# Patient Record
Sex: FEMALE | Race: OTHER | HISPANIC OR LATINO | Employment: PART TIME | URBAN - METROPOLITAN AREA
[De-identification: names, ages, dates, MRNs, and addresses within clinical notes are randomized per-mention and may not be internally consistent; named-entity substitution may affect disease eponyms.]

---
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
## 2024-07-22 ENCOUNTER — HOSPITAL ENCOUNTER (EMERGENCY)
Facility: HOSPITAL | Age: 19
Discharge: HOME | End: 2024-07-22
Attending: EMERGENCY MEDICINE
Payer: COMMERCIAL

## 2024-07-22 VITALS
SYSTOLIC BLOOD PRESSURE: 128 MMHG | BODY MASS INDEX: 18.16 KG/M2 | RESPIRATION RATE: 20 BRPM | WEIGHT: 113 LBS | OXYGEN SATURATION: 100 % | HEART RATE: 80 BPM | HEIGHT: 66 IN | DIASTOLIC BLOOD PRESSURE: 90 MMHG | TEMPERATURE: 97.7 F

## 2024-07-22 DIAGNOSIS — R10.2 VAGINAL PAIN: Primary | ICD-10-CM

## 2024-07-22 LAB
EXT PREGNANCY TEST URINE: NEGATIVE
EXT. CONTROL: NORMAL

## 2024-07-22 PROCEDURE — 99283 EMERGENCY DEPT VISIT LOW MDM: CPT

## 2024-07-22 PROCEDURE — 87591 N.GONORRHOEAE DNA AMP PROB: CPT | Performed by: EMERGENCY MEDICINE

## 2024-07-22 PROCEDURE — 99284 EMERGENCY DEPT VISIT MOD MDM: CPT | Performed by: EMERGENCY MEDICINE

## 2024-07-22 PROCEDURE — 81025 URINE PREGNANCY TEST: CPT | Performed by: EMERGENCY MEDICINE

## 2024-07-22 PROCEDURE — 87491 CHLMYD TRACH DNA AMP PROBE: CPT | Performed by: EMERGENCY MEDICINE

## 2024-07-22 PROCEDURE — 87529 HSV DNA AMP PROBE: CPT | Performed by: EMERGENCY MEDICINE

## 2024-07-22 RX ORDER — ACYCLOVIR 200 MG/1
400 CAPSULE ORAL ONCE
Status: COMPLETED | OUTPATIENT
Start: 2024-07-22 | End: 2024-07-22

## 2024-07-22 RX ORDER — ACYCLOVIR 400 MG/1
400 TABLET ORAL 3 TIMES DAILY
Qty: 21 TABLET | Refills: 0 | Status: SHIPPED | OUTPATIENT
Start: 2024-07-22 | End: 2024-07-29

## 2024-07-22 RX ADMIN — ACYCLOVIR 400 MG: 200 CAPSULE ORAL at 19:58

## 2024-07-22 NOTE — ED PROVIDER NOTES
"History  Chief Complaint   Patient presents with    Vaginal Pain     Patient reports vaginal \"sensitivity\" for 2-3 days, reports \"it hurts when I touch it\" and is at other times \"just uncomfortable\". Patient denies any pain with urination, denies any discharge but reports their period just started today. Patient also reports increased pain with intercourse.      19-year-old female presents for evaluation with vaginal pain.  She reports that for the past 2 to 3 days she has been experiencing pain with intercourse, as well as pain to the touch around her clitoris.  She denies any vaginal discharge.  She states that she has not looked at the area, is unsure if there are any lesions or rashes.  She denies any fevers, chills, abdominal pain, or other concerning symptoms.  States that she has had unprotected intercourse but that she has been with the same partner for 3 years.        None       History reviewed. No pertinent past medical history.    History reviewed. No pertinent surgical history.    History reviewed. No pertinent family history.  I have reviewed and agree with the history as documented.    E-Cigarette/Vaping    E-Cigarette Use Current Some Day User      E-Cigarette/Vaping Substances    Nicotine No     THC No     CBD No     Flavoring No     Other No     Unknown No      Social History     Tobacco Use    Smoking status: Never    Smokeless tobacco: Never   Vaping Use    Vaping status: Some Days   Substance Use Topics    Alcohol use: Yes     Comment: socially    Drug use: Yes     Types: Marijuana     Comment: socailly       Review of Systems   Constitutional:  Negative for fever.   Respiratory:  Negative for shortness of breath.    Cardiovascular:  Negative for chest pain.   Gastrointestinal:  Negative for abdominal pain, nausea and vomiting.   Genitourinary:  Positive for vaginal pain. Negative for dysuria and vaginal discharge.   All other systems reviewed and are negative.      Physical Exam  Physical " Exam  Vitals and nursing note reviewed. Exam conducted with a chaperone present.   Constitutional:       General: She is awake. She is not in acute distress.     Appearance: She is not toxic-appearing.   HENT:      Head: Normocephalic and atraumatic.   Eyes:      General: Vision grossly intact. Gaze aligned appropriately.   Cardiovascular:      Rate and Rhythm: Normal rate and regular rhythm.   Pulmonary:      Effort: Pulmonary effort is normal. No respiratory distress.   Genitourinary:     Comments: 2 small vesicular-like lesions noted on the clitoral maddne which are tender to palpation. No other lesions noted. No vaginal discharge noted on external exam.   Musculoskeletal:      Cervical back: Full passive range of motion without pain and neck supple.   Skin:     General: Skin is warm and dry.   Neurological:      General: No focal deficit present.      Mental Status: She is alert and oriented to person, place, and time.         Vital Signs  ED Triage Vitals   Temperature Pulse Respirations Blood Pressure SpO2   07/22/24 1841 07/22/24 1841 07/22/24 1841 07/22/24 1841 07/22/24 1841   97.7 °F (36.5 °C) 101 18 (!) 156/101 96 %      Temp Source Heart Rate Source Patient Position - Orthostatic VS BP Location FiO2 (%)   07/22/24 1841 07/22/24 1841 07/22/24 1841 07/22/24 1841 --   Temporal Monitor Sitting Left arm       Pain Score       07/22/24 1930       2           Vitals:    07/22/24 1841 07/22/24 1930 07/22/24 2000   BP: (!) 156/101 141/98 128/90   Pulse: 101 85 80   Patient Position - Orthostatic VS: Sitting Lying Sitting         Visual Acuity      ED Medications  Medications   acyclovir (ZOVIRAX) capsule 400 mg (400 mg Oral Given 7/22/24 1958)       Diagnostic Studies  Results Reviewed       Procedure Component Value Units Date/Time    Chlamydia/GC amplified DNA by PCR [[id number]]  (Normal) Collected: 07/22/24 1924    Lab Status: Final result Specimen: Urine, Other Updated: 07/23/24 1239     N gonorrhoeae, DNA Probe  Negative     Chlamydia trachomatis, DNA Probe Negative    Narrative:      This test was performed using the FDA-approved Mohan 6800 CT/NG assay (Roche [employer]). This test uses real-time PCR to detect Chlamydia trachomatis (CT) and Neisseria gonorrhoeae (NG). This instrument and assay have been validated by the  and performing laboratory and verified by the performing laboratory.  This test is intended as an aid in the diagnosis of chlamydial and gonococcal disease. This test has not been evaluated in patients younger than 14 years of age and is not recommended for evaluation of suspected sexual abuse. This assay is not intended to replace other exams or tests for diagnosis of urogenital infection by causative factors other than Chalmydia trachomatis (CT) and Neisseria gonorrhoeae (NG). Additional testing is recommended when the results do not correlate with clinical signs and symptoms.   Procedural Limitations  This assay has only been validated for use with male and female urine, clinician-instructed self-collected vaginal swab specimens, clinician-collected vaginal swab specimens, endocervical swab specimens collected in mohan® PCR Media and cervical specimens collected in PreservCyt® Solution. Assay performance has not been validated for use with other collection media and/or specimen types.   Detection of C. trachomatis and N. gonorrhoeaea is dependent on the number of organisms present in the specimen. Detection may be affected by specimen collection methods, patient factors, stage of infection, infecting strains, and presence of polymerase/PCR inhibitors.   When CT is present at very high concentrations, the detection of NG present at concentrations near the limit of detection may be impacted.  The presence of mucus in endocervical specimens may lead to false negative results.  The presence of whole blood in urine and cervical specimens collected in PreservCyt Solution may lead to false  negative and/or invalid test results.   Urine testing is recommended to be performed on first catch urine samples. The effects of other collection variables have not been evaluated at this time. The effects of vaginal discharge, tampon use, douching, and other collection variables have not been evaluated at this time.   This assay has not been evaluated with patients currently being treated with antimicrobial agents active against CT or NG, or patients with a history of hysterectomy.     HSV TYPE 1,2 DNA PCR [facility] SWAB ONLY [[id number]]  (Normal) Collected: 07/22/24 1925    Lab Status: Final result Specimen: Swab from Vulva Updated: 07/23/24 1231     HSV 1 PCR Not Detected     HSV 2 PCR Not Detected    Narrative:      This test has been performed using RT-PCR on the Diasorin Molecular Simplexa. This test has been FDA cleared, validated by the , and verified by the performing laboratory.  The DiaSorin Molecular Simplexa HSV 1 & 2 Direct is intended for use on the Centrix instrument for the qualitative detection and differentiation of HSV-1 and HSV-2 DNA in mucocutaneous and cutaneous lesion swabs and cerebrospinal fluid (CSF) of patients with suspected infection. Conserved regions of HSV-1 and HSV-2 DNA polymerase genes are targeted to identify HSV-1 and HSV-2 DNA, respectively. Results are for the presumptive identification of HSV-1 and/or HSV-2. This test is intended for use in conjunction with clinical presentation and other laboratory markers for the clinical management of HSV-1/HSV-2 infected patients.     Positive results are indicative of infection, but do not rule out bacterial infection or co-infection with other viruses.     Negative results do not preclude viral infection and should not be used as the sole basis for treatment or other patient management decisions. Negative results must be combined with clinical observations, patient history, and epidemiological information.   Invalid or  "inconclusive results do not preclude infection. Recollection recommended.    Procedural Limitations:  Viral nucleic acid detection is dependent on sample collection, transport, handling, storage, and preparation.  Detection of target analytes(s) does not imply that the corresponding viruses are infectious or are the causative agent for clinical symptoms.     False-negative results may occur if the viruses are present at a level below the analytical sensitivity of the assay, if the virus has genomic mutations, or if the test is performed very early in the course of illness.  When very high levels of HSV-1 are present with very low levels of HSV-2, the signal from the HSV-2 reaction may not be adequate to be detected.  This test has not been established for screening of blood or blood products for the presence of HSV.      POCT pregnancy, urine [[id number]]  (Normal) Resulted: 07/22/24 1925    Lab Status: Final result Updated: 07/22/24 1925     EXT Preg Test, Ur Negative     Control Valid                   No orders to display              Procedures  Procedures         ED Course  ED Course as of 07/24/24 1631   Mon Jul 22, 2024 1928 PREGNANCY TEST URINE: Negative         CRAFFT      Flowsheet Row Most Recent Value   CRAFFT Initial Screen: During the past 12 months, did you:    1. Drink any alcohol (more than a few sips)?  No Filed at: 07/22/2024 1926   2. Smoke any marijuana or hashish No Filed at: 07/22/2024 1926   3. Use anything else to get high? (\"anything else\" includes illegal drugs, over the counter and prescription drugs, and things that you sniff or 'kwong')? No Filed at: 07/22/2024 1926                                              Medical Decision Making  19 year old female presents for evaluation with vaginal pain. On external exam, patient noted to have 2 small vesicular-like lesions on the clitoral madden which were tender to the touch. No other lesions, rashes, or discharge noted. Area was swabbed for " HSV. Patient also agreeable to GC/chlamydia testing which was done through the urine. Patient given empiric treatment for possible HSV infection with oral acyclovir. Discharged home in stable condition with symptomatic care instructions, Gyn follow up, and strict ED return precautions.     Amount and/or Complexity of Data Reviewed  Labs: ordered. Decision-making details documented in ED Course.    Risk  Prescription drug management.                 Disposition  Final diagnoses:   Vaginal pain     Time reflects when diagnosis was documented in both MDM as applicable and the Disposition within this note       Time User Action Codes Description Comment    7/22/2024  8:01 PM Agustina Flowers Add [R10.2] Vaginal pain           ED Disposition       ED Disposition   Discharge    Condition   Stable    Date/Time   Mon Jul 22, 2024 2001    Comment   Faith Tilley discharge to home/self care.                   Follow-up Information       Follow up With Specialties Details Why Contact Info Additional Information    [facility] Obstetrics & Gynecology [facility] Ob[facility]etrics and Gynecology Schedule an appointment as soon as possible for a visit in 1 week  [address] [address]  [phone] [facility] Obstetrics & Gynecology [facility], [address] [address]   [phone]    [facility] Emergency Department Emergency Medicine Go to  If symptoms worsen [address] [facility] [id number] [phone] [facility], [address] [place], [place], [address]            Discharge Medication List as of 7/22/2024  8:04 PM        START taking these medications    Details   acyclovir (ZOVIRAX) 400 MG tablet Take 1 tablet (400 mg total) by mouth 3 (three) times a day for 7 days, Starting Mon 7/22/2024, Until Mon 7/29/2024, Normal             No discharge procedures on file.    PDMP Review       None             ED Provider  Electronically Signed by             Agustina Flowers,   07/24/24 8852

## 2024-07-22 NOTE — ED NOTES
Assigned ER Provider and RN x1 in with pt. Vaginal exam in progress, pt tolerated well.     Rafael Grant RN  07/22/24 0831

## 2024-07-23 LAB
C TRACH DNA SPEC QL NAA+PROBE: NEGATIVE
HSV1 DNA SPEC QL NAA+PROBE: NOT DETECTED
HSV1 DNA SPEC QL NAA+PROBE: NOT DETECTED
N GONORRHOEA DNA SPEC QL NAA+PROBE: NEGATIVE

## 2024-07-23 NOTE — DISCHARGE INSTRUCTIONS
You will be called with the results of your testing.  You may take the prescribed antiviral medication until you receive these results.  You should either abstain from sexual intercourse or use barrier protection until directed otherwise.

## 2024-09-22 ENCOUNTER — HOSPITAL ENCOUNTER (EMERGENCY)
Facility: HOSPITAL | Age: 19
Discharge: HOME | End: 2024-09-22
Attending: EMERGENCY MEDICINE
Payer: COMMERCIAL

## 2024-09-22 VITALS
DIASTOLIC BLOOD PRESSURE: 78 MMHG | SYSTOLIC BLOOD PRESSURE: 116 MMHG | OXYGEN SATURATION: 100 % | TEMPERATURE: 98.8 F | HEART RATE: 100 BPM | HEIGHT: 66 IN | RESPIRATION RATE: 20 BRPM | WEIGHT: 113.98 LBS | BODY MASS INDEX: 18.32 KG/M2

## 2024-09-22 DIAGNOSIS — L02.91 ABSCESS: Primary | ICD-10-CM

## 2024-09-22 PROCEDURE — 99282 EMERGENCY DEPT VISIT SF MDM: CPT

## 2024-09-22 PROCEDURE — 99284 EMERGENCY DEPT VISIT MOD MDM: CPT | Performed by: EMERGENCY MEDICINE

## 2024-09-22 PROCEDURE — 10061 I&D ABSCESS COMP/MULTIPLE: CPT | Performed by: EMERGENCY MEDICINE

## 2024-09-22 RX ORDER — SULFAMETHOXAZOLE/TRIMETHOPRIM 800-160 MG
1 TABLET ORAL 2 TIMES DAILY
Qty: 14 TABLET | Refills: 0 | Status: SHIPPED | OUTPATIENT
Start: 2024-09-22 | End: 2024-09-29

## 2024-09-22 RX ORDER — IBUPROFEN 600 MG/1
600 TABLET, FILM COATED ORAL EVERY 6 HOURS PRN
Qty: 30 TABLET | Refills: 0 | Status: SHIPPED | OUTPATIENT
Start: 2024-09-22

## 2024-09-22 RX ORDER — LIDOCAINE HYDROCHLORIDE AND EPINEPHRINE 10; 10 MG/ML; UG/ML
1 INJECTION, SOLUTION INFILTRATION; PERINEURAL ONCE
Status: DISCONTINUED | OUTPATIENT
Start: 2024-09-22 | End: 2024-09-22 | Stop reason: HOSPADM

## 2024-09-22 NOTE — DISCHARGE INSTRUCTIONS
A  personal message from Dr. Dakotah Rowe,  Thank you so much for allowing me to care for you today.    I pride myself in the care and attention I give all my patients.  I hope you were a witness to this tonight.   If for any reason your condition does not improve or worsens, or you have a question that was not answered during your visit you can feel free to text me on my personal phone #  # [phone].   I will answer to your message and continue your care past your emergency room visit.     Please understand that although you are being discharged because your condition has been deemed stable and able to be managed on an outpatient setting. However your condition may worsen as part of the natural progression of the illness/condition, if this occurs please come back to the emergency department for a repeat evaluation.

## 2024-09-22 NOTE — ED PROVIDER NOTES
1. Abscess      ED Disposition       ED Disposition   Discharge    Condition   Stable    Date/Time   Sun Sep 22, 2024 12:41 PM    Comment   Faith Tilley discharge to home/self care.                   Assessment & Plan       Medical Decision Making  With L axillary soft tissue mass    Procedure note: Incision and drainage, performed by me  Area was cleansed with alcohol swab.  Then numbed with lido with epi by local infiltration approximately 2 to 3 cc.  After which #11 blade was used to dissect into the wound and positive small to months of purulent material was obtained.  The area was explored for loculations.  And left open to heal.    Patient did get sweaty and near syncopal during the episode so she was laid down to rest for a little while to recover.    Problems Addressed:  Abscess: acute illness or injury    Risk  Prescription drug management.                     Medications - No data to display    History of Present Illness       Faith Tilley is a 19 y.o.  year old female  History reviewed. No pertinent past medical history.  Social History    Tobacco Use      Smoking status: Never      Smokeless tobacco: Never    Vaping Use      Vaping status: Some Days    Alcohol use: Yes      Comment: socially    Drug use: Yes      Types: Marijuana      Comment: socailly    Patient presents with:  Mass: Patient arrived to ER c/o lump that was noticed a year ago, worsening 2 weeks ago. Patient states its increased in size, located underneath lt arm pit. 8/10 pain. Patient states she squeezed it and puss came out on wednesday, now it is hard to touch and painful.       History obtained directly from the PATIENT              History provided by:  Patient   used: No        Review of Systems   Constitutional:  Negative for chills and fever.   HENT:  Negative for ear pain and sore throat.    Eyes:  Negative for pain and visual disturbance.   Respiratory:  Negative for cough and shortness of breath.     Cardiovascular:  Negative for chest pain and palpitations.   Gastrointestinal:  Negative for abdominal pain and vomiting.   Genitourinary:  Negative for dysuria and hematuria.   Musculoskeletal:  Negative for arthralgias and back pain.   Skin:  Positive for wound. Negative for color change and rash.   Neurological:  Negative for seizures and syncope.   All other systems reviewed and are negative.          Objective     ED Triage Vitals [09/22/24 1227]   Temperature Pulse Blood Pressure Respirations SpO2 Patient Position - Orthostatic VS   98.8 °F (37.1 °C) 100 116/78 20 100 % Sitting      Temp Source Heart Rate Source BP Location FiO2 (%) Pain Score    Tympanic Monitor Left arm -- --        Physical Exam  Vitals and nursing note reviewed.   Constitutional:       General: She is not in acute distress.     Appearance: Normal appearance. She is well-developed and normal weight.   HENT:      Head: Normocephalic and atraumatic.   Eyes:      Conjunctiva/sclera: Conjunctivae normal.   Cardiovascular:      Rate and Rhythm: Normal rate and regular rhythm.      Heart sounds: No murmur heard.  Pulmonary:      Effort: Pulmonary effort is normal. No respiratory distress.      Breath sounds: Normal breath sounds.   Abdominal:      Palpations: Abdomen is soft.      Tenderness: There is no abdominal tenderness.   Musculoskeletal:         General: No swelling.      Cervical back: Neck supple.   Skin:     General: Skin is warm and dry.      Capillary Refill: Capillary refill takes less than 2 seconds.      Comments: Patient with soft mass in the L axillae  No active drainage  NOT hot to touch  + tender   Neurological:      General: No focal deficit present.      Mental Status: She is alert and oriented to person, place, and time.   Psychiatric:         Mood and Affect: Mood normal.         Labs Reviewed - No data to display  No orders to display       Procedures    ED Medication and Procedure Management   Prior to Admission  Medications   Prescriptions Last Dose Informant Patient Reported? Taking?   acyclovir (ZOVIRAX) 400 MG tablet   No No   Sig: Take 1 tablet (400 mg total) by mouth 3 (three) times a day for 7 days      Facility-Administered Medications: None     Discharge Medication List as of 9/22/2024 12:43 PM        START taking these medications    Details   ibuprofen (MOTRIN) 600 mg tablet Take 1 tablet (600 mg total) by mouth every 6 (six) hours as needed for moderate pain, Starting Sun 9/22/2024, Normal      sulfamethoxazole-trimethoprim (BACTRIM DS) 800-160 mg per tablet Take 1 tablet by mouth 2 (two) times a day for 7 days smx-tmp DS (BACTRIM) 800-160 mg tabs (1tab q12 D10), Starting Sun 9/22/2024, Until Sun 9/29/2024, Normal           CONTINUE these medications which have NOT CHANGED    Details   acyclovir (ZOVIRAX) 400 MG tablet Take 1 tablet (400 mg total) by mouth 3 (three) times a day for 7 days, Starting Mon 7/22/2024, Until Mon 7/29/2024, Normal           No discharge procedures on file.     Dakotah Rowe MD  09/24/24 1188

## 2025-04-30 ENCOUNTER — HOSPITAL ENCOUNTER (EMERGENCY)
Facility: HOSPITAL | Age: 20
Discharge: HOME | End: 2025-04-30
Payer: COMMERCIAL

## 2025-04-30 VITALS
SYSTOLIC BLOOD PRESSURE: 147 MMHG | RESPIRATION RATE: 20 BRPM | DIASTOLIC BLOOD PRESSURE: 89 MMHG | HEART RATE: 102 BPM | OXYGEN SATURATION: 100 % | WEIGHT: 116.4 LBS | BODY MASS INDEX: 18.79 KG/M2 | TEMPERATURE: 98.9 F

## 2025-04-30 DIAGNOSIS — K13.0: Primary | ICD-10-CM

## 2025-04-30 PROCEDURE — 99284 EMERGENCY DEPT VISIT MOD MDM: CPT

## 2025-04-30 PROCEDURE — 96372 THER/PROPH/DIAG INJ SC/IM: CPT

## 2025-04-30 PROCEDURE — 99282 EMERGENCY DEPT VISIT SF MDM: CPT

## 2025-04-30 RX ORDER — PREDNISONE 20 MG/1
20 TABLET ORAL DAILY
Qty: 5 TABLET | Refills: 0 | Status: SHIPPED | OUTPATIENT
Start: 2025-04-30

## 2025-04-30 RX ORDER — NAPROXEN 500 MG/1
500 TABLET ORAL 2 TIMES DAILY WITH MEALS
Qty: 30 TABLET | Refills: 0 | Status: SHIPPED | OUTPATIENT
Start: 2025-04-30

## 2025-04-30 RX ORDER — KETOROLAC TROMETHAMINE 30 MG/ML
15 INJECTION, SOLUTION INTRAMUSCULAR; INTRAVENOUS ONCE
Status: COMPLETED | OUTPATIENT
Start: 2025-04-30 | End: 2025-04-30

## 2025-04-30 RX ORDER — DEXAMETHASONE 4 MG/1
10 TABLET ORAL ONCE
Status: COMPLETED | OUTPATIENT
Start: 2025-04-30 | End: 2025-04-30

## 2025-04-30 RX ORDER — DIPHENHYDRAMINE HCL 25 MG
25 TABLET ORAL ONCE
Status: COMPLETED | OUTPATIENT
Start: 2025-04-30 | End: 2025-04-30

## 2025-04-30 RX ADMIN — DEXAMETHASONE 10 MG: 4 TABLET ORAL at 22:13

## 2025-04-30 RX ADMIN — KETOROLAC TROMETHAMINE 15 MG: 30 INJECTION, SOLUTION INTRAMUSCULAR at 22:13

## 2025-04-30 RX ADMIN — DIPHENHYDRAMINE HYDROCHLORIDE 25 MG: 25 TABLET ORAL at 22:19

## 2025-05-01 NOTE — ED PROVIDER NOTES
Time reflects when diagnosis was documented in both MDM as applicable and the Disposition within this note       Time User Action Codes Description Comment    4/30/2025 10:11 PM Pepe Champagneel Add [K13.0] Hypertrophic oral labial frenulum           ED Disposition       ED Disposition   Discharge    Condition   Stable    Date/Time   Wed Apr 30, 2025 10:12 PM    Comment   Faith Tilley discharge to home/self care.                   Assessment & Plan       Medical Decision Making  Patient is a 20 year old female with no significant PMHx, presenting to the ED for evaluation of upper gum/lip swelling noticed yesterday. Patient states that she noticed that the frenulum of her upper lip was irritated yesterday, and she felt it was larger than normal. Today notes associated upper lip swelling in the area. Denies any tongue swelling, airway edema, difficulty swallowing, mouth itching, dental pain, difficulty talking. Patient states that she does not floss, but does brush her teeth regularly. Denies fevers, chills, rash.    Ddx: frenulum irritation/hypertrophy. Doubt cellulitis, angioedema, acute allergic reaction, dentalgia, dental infection, or abscess.    Patient is afebrile, overall well appearing. No evidence of airway compromise. Will treat with antiinflammatories and steroids. Patient states that she is going to the [facility] tomorrow, where she has a dentist that she can see. No indication for antibiotic therapy at this time. Will Rx Naprosyn and Prednisone. Patient also provided with Benadryl in the ED for discomfort, though doubt this is from acute allergic reaction.     Patient observed in the ED for improvement and she does note some improvement in swelling with medications. Stable for discharge home and was given strict return precautions.    I gave oral return precautions for what to return for in addition to the written return precautions.   The patient verbalized understanding of the discharge  instructions and warnings that would necessitate return to the Emergency Department.  I specifically highlighted areas of special concern regarding the written and verbal discharge instructions and return precautions.    All questions were answered prior to discharge.      Risk  OTC drugs.  Prescription drug management.             Medications   dexamethasone (DECADRON) tablet 10 mg (10 mg Oral Given 4/30/25 2213)   ketorolac (TORADOL) injection 15 mg (15 mg Intramuscular Given 4/30/25 2213)   diphenhydrAMINE (BENADRYL) tablet 25 mg (25 mg Oral Given 4/30/25 2219)       ED Risk Strat Scores                    No data recorded        SBIRT 20yo+      Flowsheet Row Most Recent Value   Initial Alcohol Screen: US AUDIT-C     1. How often do you have a drink containing alcohol? 0 Filed at: 04/30/2025 2206   2. How many drinks containing alcohol do you have on a typical day you are drinking?  0 Filed at: 04/30/2025 2206   3b. FEMALE Any Age, or MALE 65+: How often do you have 4 or more drinks on one occassion? 0 Filed at: 04/30/2025 2206   Audit-C Score 0 Filed at: 04/30/2025 2206   KRYSTEN: How many times in the past year have you...    Used an illegal drug or used a prescription medication for non-medical reasons? Never Filed at: 04/30/2025 2206                            History of Present Illness       Chief Complaint   Patient presents with    Lip Swelling     Pt to ER from home for upper gum / lip swelling since yesterday. Upper gum soft tissue coming through gap in upper teeth now as well.       Past Medical History:   Diagnosis Date    Breast mass, left 2018    Scoliosis       History reviewed. No pertinent surgical history.   Family History   Problem Relation Age of Onset    Alopecia Mother     Arthritis Mother     Hypertension Mother     Heart disease Brother     Liver cancer Maternal Grandfather       Social History     Tobacco Use    Smoking status: Never    Smokeless tobacco: Never   Vaping Use    Vaping status:  Some Days   Substance Use Topics    Alcohol use: Yes     Comment: socially    Drug use: Yes     Types: Marijuana     Comment: socailly      E-Cigarette/Vaping    E-Cigarette Use Current Some Day User       E-Cigarette/Vaping Substances    Nicotine No     THC No     CBD No     Flavoring No     Other No     Unknown No       I have reviewed and agree with the history as documented.     HPI    Patient is a 20 year old female with no significant PMHx, presenting to the ED for evaluation of upper gum/lip swelling noticed yesterday. Patient states that she noticed that the frenulum of her upper lip was irritated yesterday, and she felt it was larger than normal. Today notes associated upper lip swelling in the area. Denies any tongue swelling, airway edema, difficulty swallowing, mouth itching, dental pain, difficulty talking. Patient states that she does not floss, but does brush her teeth regularly. Denies fevers, chills, rash.    Review of Systems   All other systems reviewed and are negative.          Objective       ED Triage Vitals [04/30/25 2201]   Temperature Pulse Blood Pressure Respirations SpO2 Patient Position - Orthostatic VS   98.9 °F (37.2 °C) 102 147/89 20 100 % Sitting      Temp Source Heart Rate Source BP Location FiO2 (%) Pain Score    Oral Monitor Right arm -- 4      Vitals      Date and Time Temp Pulse SpO2 Resp BP Pain Score FACES Pain Rating User   04/30/25 2213 -- -- -- -- -- 5 -- KW   04/30/25 2201 98.9 °F (37.2 °C) 102 100 % 20 147/89 4 -- AM            Physical Exam  Vitals and nursing note reviewed.   Constitutional:       General: She is not in acute distress.     Appearance: Normal appearance. She is well-developed and normal weight. She is not ill-appearing, toxic-appearing or diaphoretic.   HENT:      Head: Normocephalic and atraumatic.      Right Ear: External ear normal.      Left Ear: External ear normal.      Nose: Nose normal. No congestion.      Mouth/Throat:      Mouth: Mucous  membranes are moist. No lacerations, oral lesions or angioedema.      Tongue: No lesions. Tongue does not deviate from midline.      Palate: No mass and lesions.      Pharynx: Oropharynx is clear. No oropharyngeal exudate or posterior oropharyngeal erythema.      Tonsils: No tonsillar exudate or tonsillar abscesses.      Comments: Patient has some localized swelling to the oral labial frenulum. There is part of frenulum that is between front 2 incisors, which patient states is normal for her. Some mild swelling of the upper gingiva. No dental abscess or area of fluctuance/induration.  Eyes:      Conjunctiva/sclera: Conjunctivae normal.   Cardiovascular:      Rate and Rhythm: Normal rate and regular rhythm.      Pulses: Normal pulses.      Heart sounds: Normal heart sounds. No murmur heard.  Pulmonary:      Effort: Pulmonary effort is normal. No respiratory distress.      Breath sounds: Normal breath sounds. No wheezing, rhonchi or rales.   Abdominal:      General: Abdomen is flat.      Palpations: Abdomen is soft.   Musculoskeletal:         General: No swelling.      Cervical back: Normal range of motion and neck supple. No tenderness.   Skin:     General: Skin is warm and dry.      Capillary Refill: Capillary refill takes less than 2 seconds.      Findings: No erythema.   Neurological:      General: No focal deficit present.      Mental Status: She is alert and oriented to person, place, and time.   Psychiatric:         Mood and Affect: Mood normal.         Results Reviewed       None            No orders to display       Procedures    ED Medication and Procedure Management   Prior to Admission Medications   Prescriptions Last Dose Informant Patient Reported? Taking?   acyclovir (ZOVIRAX) 400 MG tablet   No No   Sig: Take 1 tablet (400 mg total) by mouth 3 (three) times a day for 7 days   ibuprofen (MOTRIN) 600 mg tablet   No No   Sig: Take 1 tablet (600 mg total) by mouth every 6 (six) hours as needed for moderate  pain      Facility-Administered Medications: None     Discharge Medication List as of 4/30/2025 10:43 PM        START taking these medications    Details   naproxen (Naprosyn) 500 mg tablet Take 1 tablet (500 mg total) by mouth 2 (two) times a day with meals, Starting Wed 4/30/2025, Normal      predniSONE 20 mg tablet Take 1 tablet (20 mg total) by mouth daily, Starting Wed 4/30/2025, Normal           CONTINUE these medications which have NOT CHANGED    Details   acyclovir (ZOVIRAX) 400 MG tablet Take 1 tablet (400 mg total) by mouth 3 (three) times a day for 7 days, Starting Mon 7/22/2024, Until Mon 7/29/2024, Normal      ibuprofen (MOTRIN) 600 mg tablet Take 1 tablet (600 mg total) by mouth every 6 (six) hours as needed for moderate pain, Starting Sun 9/22/2024, Normal           No discharge procedures on file.  ED SEPSIS DOCUMENTATION   Time reflects when diagnosis was documented in both MDM as applicable and the Disposition within this note       Time User Action Codes Description Comment    4/30/2025 10:11 PM Hector Champagne Add [K13.0] Hypertrophic oral labial frenulum                  Hector Champagne,   04/30/25 0049

## 2025-05-01 NOTE — DISCHARGE INSTRUCTIONS
Please follow up with dentist as soon as possible.     If swelling worsens, or if you develop a fever, return to the ED with any new/concerning issues.    Take anti-inflammatory and Prednisone as prescribed.